# Patient Record
(demographics unavailable — no encounter records)

---

## 2018-03-28 NOTE — HP
PRIMARY CARE PHYSICIAN:  In [place], [place], Dr. Epi Gordon.



DATE OF SERVICE: 03/27/2018

 

REASON FOR ADMISSION:  Direct admission from [facility] 
emergency room to our hospital for CHF exacerbation.

 

HISTORY OF PRESENT ILLNESS:  An 87-year-old male who has a history of chronic 
diastolic heart failure.  His last echo in our hospital in 08/2015, at that time
, EF was normal.  He has history of severe aortic stenosis and severe mitral 
regurgitation as well as severe tricuspid regurgitation.  This patient had 
aortic valve replacement.  He went to [place] emergency room with increasing 
shortness of breath.  Patient reports that the weather was bad in [place] 
and he got shortness of breath.  He was not able to take a deep breath and he 
was not improving and that is why his wife advised him to go to emergency room.
  At [place] emergency room, patient was found with elevated BNP.  
Creatinine 1.5, and a T inversion in lead V1-V3.  Patient was also having 
increasing lower extremity edema and orthopnea as well as dyspnea on exertion 
and considering CHF exacerbation.  This patient was directly admitted to 
telemetry floor.  As per my report at [place] emergency room, physician 
spoke with Dr. Harvey, who advised this patient to go to telemetry floor for 
direct admission.

 

When I saw this patient at that time, patient was comfortable.  He reports that 
he is feeling much better since the emergency room treatment.

 

Patient repaired another echocardiography in 07/2017, at that time, which 
showed normal EF as well as normal diastolic function and normal functioning 
bioprosthetic valve in the aortic position.

 

PAST MEDICAL HISTORY:

1.  History of CVA, hypertension, dyslipidemia, coronary artery disease with 
history of CABG x1 in 08/2015

2.  History of aortic stenosis required aortic valve replacement in 08/2015 by 
Dr. Hobson with Magna bioprosthetic valve

3.  History of severe mitral regurgitation and severe tricuspid regurgitation.

 

PAST SURGICAL HISTORY:  Aortic valve replacement with bioprosthetic valve, CABG 
x1, thyroidectomy.

 

PAST PSYCHIATRIC HISTORY:  Reviewed and negative.

 

CODE STATUS:  FULL CODE.  Patient's wife is surrogate decision maker.

 

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  He is 
totally independent.  He has no history of tobacco, alcohol, or illicit drug 
abuse.

 

ALLERGIES:  The patient is allergic to IODINE.

 

FAMILY HISTORY:  No strong family history of premature coronary artery disease, 
stroke, or cancer.

 

REVIEW OF SYSTEMS:  The following complete review of systems was negative, 
unless otherwise mentioned in the HPI or below:

Constitutional:  Weight loss or gain, ability to conduct usual activities.

Skin:  Rash, itching.

Eyes:  Double vision, pain.

ENT/Mouth:  Nose bleeding, neck stiffness, pain, tenderness.

Cardiovascular:  Palpitations, dyspnea on exertion, orthopnea.

Respiratory:  Shortness of breath, wheezing, cough, hemoptysis, fever or night 
sweats.

Gastrointestinal:  Poor appetite, abdominal pain, heartburn, nausea, vomiting, 
constipation, or diarrhea.

Genitourinary:  Urgency, frequency, dysuria, nocturia.

Musculoskeletal:  Pain, swelling.

Neurologic/Psychiatric:  Anxiety, depression.

Allergy/Immunologic:  Skin rash, bleeding tendency.

 

Please see my HPI for pertinent positive and negative.  All other review of 
systems reviewed and negative except as mentioned in the HPI.

 

EMERGENCY ROOM COURSE:  Patient is given Lasix at other emergency room.

 

CURRENT HOME MEDICATIONS:  Amlodipine 5 mg p.o. daily, aspirin 325 mg p.o. daily
, vitamin D3 of 1000 unit p.o. daily, Plavix 75 mg p.o. daily, vitamin B12 of 
1000 mcg p.o. daily, lisinopril 10 mg p.o. daily, Zocor 20 mg p.o. at bedtime, 
Coenzyme Q10 of 100 mg p.o. daily.

 

PHYSICAL EXAMINATION:

VITAL SIGNS:  Currently, temperature 97.5, pulse 77, respiratory rate 18, 
saturation 100% on 2 liter oxygen, blood pressure 134/89, weight 172 pounds.

GENERAL:  Patient is currently alert, awake, no obvious acute distress.

HEENT:  Normocephalic, atraumatic.  Eyes:  Pupils round, reactive to light.  
Extraocular muscle intact.

ENT:  Oropharynx within normal limits.  Moist mucous membranes, no oral lesion, 
no pharyngeal erythema, no exudate.

NECK:  Supple, no JVD, no thyromegaly, no carotid bruit.

LUNGS:  Air entry reduced at base.  Few basal rales noted.

CARDIAC:  S1, S2 appears regular.  Systolic murmur noted parasternally with 
bioprosthetic click.  No gallop, no rub.

ABDOMEN:  Soft, bowel sounds present, nontender, nondistended.  No organomegaly
, no mass, no suprapubic tenderness.

BACK:  Unremarkable, no CVA tenderness.

EXTREMITIES:  Upper extremity, passive movement of all joints are normal.  
Lower extremity, trace bilateral pitting edema noted.  Good distal pulsation.  
No calf tenderness.

SKIN:  No skin rash.

HEMATOLOGICAL:  No lymphadenopathy.

PSYCHIATRIC:  Normal affect.

 

SIGNIFICANT LABORATORY DATA:  , creatinine 1.5.  EKG, T inversion in 
lead V1-V3.  Chest x-ray showing small pleural effusion, pulmonary vascular 
congestion.  All other blood tests including CBC, BMP, and coagulation profile 
noted at a local emergency room in [place].

 

ASSESSMENT AND PLAN:

IMPRESSION:

1.  Acute on chronic diastolic congestive heart failure exacerbation.

2.  History of aortic stenosis with a history of bioprosthetic aortic valve 
replacement.

3.  History of coronary artery disease with a history of coronary artery bypass 
grafting x1.

4.  Hypertension.

5.  Dyslipidemia.

6.  Chronic kidney disease stage 3.

 

PLAN:  Admission to telemetry floor.  Continue Lasix 40 mg IV b.i.d., monitor 
input output chart.  Monitor labs and replacing electrolytes accordingly.  
Resume patient's home medications once verified.  DVT prophylaxis is Lovenox 40 
mg subcutaneously daily.  Gastrointestinal prophylaxis with Pepcid 20 mg p.o. 
b.i.d., DuoNeb q.6 hourly.  Monitor vitals and adjust blood pressure medication 
while in hospital.  Repeat labs tomorrow morning.

 

CODE STATUS:  The patient is FULL CODE.  The patient's wife is surrogate 
decision maker.

 

Disposition plan based on clinical course.  We are expecting patient's stay in 
hospital more than 2 midnights.  Plan of care discussed with the patient in 
detail.

 

MTDD

## 2018-03-28 NOTE — PDOC.PN
- Subjective


Encounter Start Date: 03/28/18


Encounter Start Time: 07:20





Pt seen for followup re:  CHF exacerbation.  Reports feeling better.  No nausea 

or vomiting.  Dyspnea is better.  No chest pain.





- Objective


Resuscitation Status: 


 











Resuscitation Status           FULL:Full Resuscitation














MAR Reviewed: Yes


Vital Signs & Weight: 


 Vital Signs (12 hours)











  Temp Pulse Pulse Pulse Resp BP BP


 


 03/28/18 12:12   80    14  


 


 03/28/18 12:00  97.8 F  75    16  


 


 03/28/18 08:47    77  80   127/75  136/80


 


 03/28/18 08:00  98.1 F  74    18  


 


 03/28/18 07:03   70    14  


 


 03/28/18 04:00  98.2 F  76    16  














  BP Pulse Ox Pulse Ox Pulse Ox


 


 03/28/18 12:12    


 


 03/28/18 12:00  128/74  96  


 


 03/28/18 08:47    91 L  92 L


 


 03/28/18 08:00  130/74  98  


 


 03/28/18 07:03    


 


 03/28/18 04:00  117/71  96  








 Weight











Weight                         172 lb 1 oz














I&O: 


 











 03/27/18 03/28/18 03/29/18





 06:59 06:59 06:59


 


Intake Total  240 


 


Balance  240 











Result Diagrams: 


 03/28/18 04:51





 03/28/18 04:51


EKG Reviewed by me: Yes (Tele:  NSR)





Phys Exam





- Physical Examination


Constitutional: NAD


HEENT: PERRLA, moist MMs, sclera anicteric, oral pharynx no lesions


Neck: no nodes, supple, full ROM


JVD


Respiratory: no wheezing, no rhonchi


Bibasal crackles


Cardiovascular: RRR, no rub


Gastrointestinal: soft, non-tender


Musculoskeletal: edema present


Neurological: moves all 4 limbs


Psychiatric: normal affect


Skin: no rash





Dx/Plan


(1) Acute on chronic diastolic (congestive) heart failure


Code(s): I50.33 - ACUTE ON CHRONIC DIASTOLIC (CONGESTIVE) HEART FAILURE   Status

: Acute   Comment: Continue diuretics, await cardiology consult   





(2) CAD (coronary artery disease)


Code(s): I25.10 - ATHSCL HEART DISEASE OF NATIVE CORONARY ARTERY W/O ANG PCTRS 

  Status: Chronic   Comment: stable   





(3) HLD (hyperlipidemia)


Code(s): E78.5 - HYPERLIPIDEMIA, UNSPECIFIED   Status: Chronic   Comment: 

continue statin   





(4) HTN (hypertension)


Code(s): I10 - ESSENTIAL (PRIMARY) HYPERTENSION   Status: Chronic   


Qualifiers: 


   Hypertension type: essential hypertension   Qualified Code(s): I10 - 

Essential (primary) hypertension   


Comment: Monitor vital signs, titrate antihypertensives as needed   





(5) H/O aortic valve replacement


Code(s): Z95.2 - PRESENCE OF PROSTHETIC HEART VALVE   Status: Chronic   Comment

: stable   





- Plan





* .








Review of Systems





- Review of Systems


Constitutional: negative: fever, chills, sweats, weakness, malaise


Respiratory: Shortness of Breath, SOB with Excertion.  negative: Cough, Dry, 

Hemoptysis, Pleuritic Pain, Sputum, Wheezing


Cardiovascular: negative: chest pain, palpitations, orthopnea, paroxysmal 

nocturnal dyspnea, edema, light headedness


Gastrointestinal: negative: Nausea, Vomiting, Abdominal Pain, Diarrhea, 

Constipation, Melena, Hematochezia


Skin: negative: Rash, Lesions, May[place], Bruising





- Medications/Allergies


Allergies/Adverse Reactions: 


 Allergies











Allergy/AdvReac Type Severity Reaction Status Date / Time


 


iodine Allergy  Rash Verified 08/11/15 00:42


 


Sulfa (Sulfonamide Allergy   Verified 07/11/17 19:48





Antibiotics)     











Medications: 


 Current Medications





Acetaminophen (Tylenol)  650 mg PO Q4H PRN


   PRN Reason: Headache/Fever or Pain


Hydrocodone Bitart/Acetaminophen (Norco 5/325)  1 tab PO Q4H PRN


   PRN Reason: Moderate Pain (4-6)


Al Hydroxide/Mg Hydroxide (Maalox)  30 ml PO Q6H PRN


   PRN Reason: Heartburn  or Indigestion


Albuterol/Ipratropium (Duoneb)  3 ml NEB [id number] [facility], [facility]


   Last Admin: 03/28/18 12:12 Dose:  3 ml


Artificial Tears (Tears Naturale)  0 drop EA EYE PRN PRN


   PRN Reason: Dry Eyes


Enoxaparin Sodium (Lovenox)  40 mg SC 0900 [facility], [facility]


   Last Admin: 03/28/18 08:44 Dose:  40 mg


Famotidine (Pepcid)  20 mg PO BID [facility]


   Last Admin: 03/28/18 08:44 Dose:  20 mg


Furosemide (Lasix)  40 mg SLOW IVP 0600,1400 [facility]


   Last Admin: 03/28/18 05:39 Dose:  40 mg


Guaifenesin (Robitussin Sf)  200 mg PO Q4H PRN


   PRN Reason: Cough


Hydralazine HCl (Apresoline)  10 mg SLOW IVP Q4H PRN


   PRN Reason: Systolic BP > 180


Loperamide HCl (Imodium)  2 mg PO PRN PRN


   PRN Reason: Diarrhea/Loose Stools


Loratadine (Claritin)  10 mg PO DAILYPRN PRN


   PRN Reason: Sinus Symptoms


Magnesium Hydroxide (Milk Of Magnesium)  30 ml PO DAILYPRN PRN


   PRN Reason: Constipation


Mineral Oil/White Petrolatum (Eucerin Cream)  0 gm TOP BIDPRN PRN


   PRN Reason: Dry Skin


Nitroglycerin (Nitrostat)  0.4 mg SL Q5MIN PRN


   PRN Reason: Chest Pain


Ondansetron HCl (Zofran Odt)  4 mg PO Q6H PRN


   PRN Reason: Nausea/Vomiting


Ondansetron HCl (Zofran)  4 mg IVP Q6H PRN


   PRN Reason: Nausea/Vomiting


Phenol (Chloraseptic Spray 180 Ml Bot)  0 ml PO PRN PRN


   PRN Reason: Sore Throat


Potassium Chloride (K-Dur)  40 meq PO NOW [facility]


   Stop: 03/28/18 15:30


Senna (Senokot)  2 tab PO HSPRN PRN


   PRN Reason: Constipation


Sodium Chloride (Ocean Nasal Spray 0.65%)  0 ml EA NARE QIDPRN PRN


   PRN Reason: Nasal Congestion


Sodium Chloride (Flush - Normal Saline)  10 ml IVF Q12HR [facility], [facility]


   Last Admin: 03/28/18 08:45 Dose:  10 ml


Sodium Chloride (Flush - Normal Saline)  10 ml IVF PRN PRN


   PRN Reason: Saline Flush


Zolpidem Tartrate (Ambien)  5 mg PO HSPRN PRN


   PRN Reason: Insomnia

## 2018-03-29 NOTE — PDOC.EVN
Event Note





- Event Note


Event Note: 





Pt has elevated d-dimer.  Discussed with patient and wife.  They both report he 

is not allergic to iodine or IV dye, had cardiac cath and CT angiograms in the 

past.  Will check CT to r/o PE.


Pt also appears to be developing delirium, oriented to person only.  No 

evidence of infection at this time, pt is not hypoxic.  Will check TSH and 

liver profile, will continue to monitor.  Neuro exam was nonfocal.

## 2018-03-29 NOTE — CT
CTA OF THE CHEST WITH CONTRAST

3/29/18

 

COMPARISON:  

7/12/17

 

HISTORY: 

Shortness of breath. 

 

TECHNIQUE:  

Multiple contiguous axial images are obtained in a CTA of the chest with contrast per pulmonary embol
ism protocol. 3D oblique MIP reformats and direct coronal reformats were performed.

 

FINDINGS:  

There is an abnormal web-like appearance within the main pulmonary arteries. These may represent reca
nalized remote bilateral pulmonary emboli. This could also represent some short of unusual mixing art
ifact. These abnormalities extend into the pulmonary arteries to the lower lobes. No complete occlusi
on of the pulmonary artery and vessels is seen. 

 

The heart is enlarged. There is straightening of the interventricular septum which is unchanged ruth
red to the prior examination. No hilar or mediastinal lymphadenopathy are appreciated. 

 

There are small bilateral pleural effusions with adjacent atelectasis. There is a 1.9 cm mass-like ar
ea in the lingula which has slightly enlarged compared to the prior examination. This could represent
 mild atelectasis but a pulmonary mass cannot be excluded. 

 

The visualized subdiaphragmatic structures are unremarkable. Degenerative changes are seen in the spi
ne. The chest wall soft tissues are unremarkable. 

 

IMPRESSION:  

1.      There are web-like opacities in the pulmonary arteries. These do not have the appearance of a
cute pulmonary emboli but these could represent recanalized chronic remote pulmonary emboli. These we
re not seen on the prior examination from 2017.

2.      Bilateral pleural effusions. 

3.      There is a mass-like area in the lingula which appears to have enlarged compared to the prior
 examination. This could represent round atelectasis but a pulmonary mass cannot be excluded. 

 

Code T

 

POS: JESSICA

## 2018-03-29 NOTE — CON
DATE OF CONSULTATION:  03/28/2018

 

PRIMARY CARDIOLOGIST:  Nam Harvey MD

 

REASON FOR ADMISSION:  Congestive heart failure.

 

HISTORY OF PRESENT ILLNESS:  Mr. Sebastián Murphy is a very pleasant 87-year-old gentleman with history of 
coronary artery disease and aortic valve disease, previous aortic valve replacement.  He was sent her
e for progressive swelling of his lower extremities and shortness of breath and has been admitted for
 further therapy.

 

The patient is not having chest pain or pressure, but he has been short of breath and having increasi
ng edema.

 

He was transferred from [place].

 

PAST MEDICAL HISTORY:

1.  History of stroke.

2.  History of bypass x1 and aortic valve replacement in 2015.

3.  History of mitral regurgitation.

 

PAST SURGICAL HISTORY:  Previous aortic valve replacement with prosthetic valve, bioprosthetic.

 

PSYCHIATRIC HISTORY:  Negative.

 

SOCIAL HISTORY:  No alcohol or tobacco.

 

ALLERGIES:  IODINE.

 

FAMILY HISTORY:  No strong family history of coronary artery disease.

 

REVIEW OF SYSTEMS:  Constitutional:  Some confusion and disorientation.  Vision:  No changes.  Hearin
g:  No changes.  Pulmonary:  Shortness of breath.  Cardiac:  Shortness of breath.  Gastrointestinal: 
 No nausea, vomiting, or diarrhea.  Skin:  No rashes.  Neurologic:  No unilateral weakness or numbnes
s.  Psychiatric:  No unusual depression or anxiety.  Hematologic:  No unusual bruising.  Genitourinar
y:  No burning with urination.

 

HOME MEDICATIONS:

1.  Amlodipine.

2.  Aspirin.

3.  Plavix.

4.  Lisinopril.

5.  Zocor.

 

PHYSICAL EXAMINATION:

GENERAL:  A pleasant elderly gentleman.  He is a little disoriented now; it is nightime and he is a l
ittle turned around, but he orients quickly.

VITAL SIGNS:  Blood pressure is 139/70, pulse 80.

HEENT:  Eyes:  Sclerae nonicteric.  Mouth:  Mucous membranes moist.

NECK:  Supple.  No lymphadenopathy.

LUNGS:  Few basilar rales.

CARDIOVASCULAR:  There is a harsh systolic murmur at the left upper sternal border.  No diastolic mur
mur, no S3.

ABDOMEN:  Soft, nontender.

EXTREMITIES:  No clubbing or cyanosis.  There is moderate edema.

SKIN:  Warm and dry.

 

PERTINENT LABORATORY AND X-RAY FINDINGS:  Potassium 3.4, creatinine is 1.06.   back in 2017.  
EKG is currently in sinus rhythm.  The 12-lead EKG does show some T-wave inversions in V3.  No other 
T-wave changes, no significant ST changes.  The most recent echocardiogram in 2015, had severe aortic
 stenosis that was pre-surgery, but the echocardiogram done 2017, revealed normal ejection fraction o
f 60-65%.

 

ASSESSMENT:

1.  Systolic heart failure, likely diastolic, acute on chronic.

2.  Previous aortic valve replacement.

3.  Volume overload.

 

PLAN:

1.  Agree with intravenous Lasix.

2.  Echocardiogram to be repeated.

3.  Dr. Harvey to resume patient's care tomorrow morning.

## 2018-03-29 NOTE — PDOC.EVN
Event Note





- Event Note


Event Note: 





CTA report reviewed.  Appears to be old pulmonary emboli, no acute PE.  Updated 

pt and wife.  In terms of confusion, pt's wife reports that the confusion 

started prior to ER visit (cause of the visit).  Will continue to monitor and 

look for other etiologies.

## 2018-03-29 NOTE — PDOC.PN
- Subjective


Encounter Start Date: 03/29/18


Encounter Start Time: 11:00





Pt seen for followup re: CHF exacerbation.  Reports feeling well.  Reports 

having chest pain with deep breaths earlier.  No nausea or vomiting.





- Objective


Resuscitation Status: 


 











Resuscitation Status           FULL:Full Resuscitation














MAR Reviewed: Yes


Vital Signs & Weight: 


 Vital Signs (12 hours)











  Temp Pulse Resp BP Pulse Ox


 


 03/29/18 08:18      100


 


 03/29/18 08:15   75  15   100


 


 03/29/18 08:14  96.2 F L  78  20  137/78  100


 


 03/29/18 08:00  96.2 F L  75  15  


 


 03/29/18 04:00  97.7 F  76  20  109/61  99








 Weight











Weight                         171 lb 9.6 oz














I&O: 


 











 03/28/18 03/29/18 03/30/18





 06:59 06:59 06:59


 


Intake Total 240 460 


 


Output Total  350 


 


Balance 240 110 











Result Diagrams: 


 03/29/18 05:02





 03/29/18 05:02


EKG Reviewed by me: Yes (Tele:  NSR)





Phys Exam





- Physical Examination


Constitutional: NAD


HEENT: PERRLA, moist MMs, sclera anicteric, oral pharynx no lesions


Neck: no nodes, supple, full ROM


JVD


Respiratory: no wheezing, no rales, no rhonchi, clear to auscultation bilateral


Cardiovascular: RRR, no rub


Gastrointestinal: soft, non-tender, positive bowel sounds


Trace edema anika LE


Neurological: moves all 4 limbs


Psychiatric: normal affect


Skin: no rash





Dx/Plan


(1) Acute on chronic diastolic (congestive) heart failure


Code(s): I50.33 - ACUTE ON CHRONIC DIASTOLIC (CONGESTIVE) HEART FAILURE   Status

: Acute   Comment: Continue diuretics.  Appreciate cardiology consult.  2D echo 

pending.   





(2) Chest pain


Code(s): R07.9 - CHEST PAIN, UNSPECIFIED   Status: Acute   Comment: Brief 

episode of pleuritic chest pain, check d-dimer to r/o PE   





(3) CAD (coronary artery disease)


Code(s): I25.10 - ATHSCL HEART DISEASE OF NATIVE CORONARY ARTERY W/O ANG PCTRS 

  Status: Chronic   Comment: stable   





(4) HLD (hyperlipidemia)


Code(s): E78.5 - HYPERLIPIDEMIA, UNSPECIFIED   Status: Chronic   Comment: 

continue statin   





(5) HTN (hypertension)


Code(s): I10 - ESSENTIAL (PRIMARY) HYPERTENSION   Status: Chronic   


Qualifiers: 


   Hypertension type: essential hypertension   Qualified Code(s): I10 - 

Essential (primary) hypertension   


Comment: titrate antihypertensives as needed   





(6) H/O aortic valve replacement


Code(s): Z95.2 - PRESENCE OF PROSTHETIC HEART VALVE   Status: Chronic   Comment

: stable   





- Plan


plan discussed w/ family, PT/OT, out of bed/ambulate





* .


Likely home 24-48 hrs.





Review of Systems





- Review of Systems


Constitutional: negative: fever, chills, sweats, weakness, malaise


Respiratory: SOB with Excertion.  negative: Cough, Dry, Shortness of Breath, 

Hemoptysis, Pleuritic Pain, Sputum, Wheezing


Cardiovascular: chest pain.  negative: palpitations, orthopnea, paroxysmal 

nocturnal dyspnea, edema, light headedness


Gastrointestinal: negative: Nausea, Vomiting, Abdominal Pain, Diarrhea, 

Constipation, Melena, Hematochezia


Genitourinary: negative: Dysuria, Frequency, Incontinence, Hematuria, Retention





- Medications/Allergies


Allergies/Adverse Reactions: 


 Allergies











Allergy/AdvReac Type Severity Reaction Status Date / Time


 


iodine Allergy  Rash Verified 08/11/15 00:42


 


Sulfa (Sulfonamide Allergy   Verified 07/11/17 19:48





Antibiotics)     











Medications: 


 Current Medications





Acetaminophen (Tylenol)  650 mg PO Q4H PRN


   PRN Reason: Headache/Fever or Pain


Hydrocodone Bitart/Acetaminophen (Norco 5/325)  1 tab PO Q4H PRN


   PRN Reason: Moderate Pain (4-6)


Al Hydroxide/Mg Hydroxide (Maalox)  30 ml PO Q6H PRN


   PRN Reason: Heartburn  or Indigestion


Albuterol/Ipratropium (Duoneb)  3 ml NEB [id number] [facility]


   Last Admin: 03/29/18 08:15 Dose:  3 ml


Amlodipine Besylate (Norvasc)  5 mg PO DAILY [facility]


   Last Admin: 03/29/18 10:55 Dose:  5 mg


Artificial Tears (Tears Naturale)  0 drop EA EYE PRN PRN


   PRN Reason: Dry Eyes


Aspirin (Ecotrin)  325 mg PO DAILY [facility]


   Last Admin: 03/29/18 10:54 Dose:  325 mg


Atorvastatin Calcium (Lipitor)  10 mg PO HS [facility]


   Last Admin: 03/28/18 21:08 Dose:  10 mg


Cholecalciferol (Vitamin D3)  1,000 units PO DAILY [facility]


   Last Admin: 03/29/18 10:54 Dose:  1,000 units


Clopidogrel Bisulfate (Plavix)  75 mg PO DAILY [facility]


   Last Admin: 03/29/18 10:55 Dose:  75 mg


Coenzyme Q10 (Coenzyme Q10)  100 mg PO DAILY [facility]


   Last Admin: 03/29/18 10:53 Dose:  100 mg


Cyanocobalamin (Vitamin B-12)  1,000 mcg PO DAILY [facility]


   Last Admin: 03/29/18 10:55 Dose:  1,000 mcg


Enoxaparin Sodium (Lovenox)  40 mg SC 0900 [facility]


   Last Admin: 03/29/18 10:56 Dose:  40 mg


Famotidine (Pepcid)  20 mg PO 0900 [facility]


Furosemide (Lasix)  40 mg SLOW IVP 0600,1400 [facility]


   Last Admin: 03/29/18 05:36 Dose:  40 mg


Guaifenesin (Robitussin Sf)  200 mg PO Q4H PRN


   PRN Reason: Cough


Hydralazine HCl (Apresoline)  10 mg SLOW IVP Q4H PRN


   PRN Reason: Systolic BP > 180


Lisinopril (Zestril)  10 mg PO DAILY [facility]


   Last Admin: 03/29/18 10:54 Dose:  10 mg


Loperamide HCl (Imodium)  2 mg PO PRN PRN


   PRN Reason: Diarrhea/Loose Stools


Loratadine (Claritin)  10 mg PO DAILYPRN PRN


   PRN Reason: Sinus Symptoms


Magnesium Hydroxide (Milk Of Magnesium)  30 ml PO DAILYPRN PRN


   PRN Reason: Constipation


Mineral Oil/White Petrolatum (Eucerin Cream)  0 gm TOP BIDPRN PRN


   PRN Reason: Dry Skin


Nitroglycerin (Nitrostat)  0.4 mg SL Q5MIN PRN


   PRN Reason: Chest Pain


Ondansetron HCl (Zofran Odt)  4 mg PO Q6H PRN


   PRN Reason: Nausea/Vomiting


Ondansetron HCl (Zofran)  4 mg IVP Q6H PRN


   PRN Reason: Nausea/Vomiting


Phenol (Chloraseptic Spray 180 Ml Bot)  0 ml PO PRN PRN


   PRN Reason: Sore Throat


Senna (Senokot)  2 tab PO HSPRN PRN


   PRN Reason: Constipation


Sodium Chloride (Ocean Nasal Spray 0.65%)  0 ml EA NARE QIDPRN PRN


   PRN Reason: Nasal Congestion


Sodium Chloride (Flush - Normal Saline)  10 ml IVF Q12HR [facility]


   Last Admin: 03/28/18 21:08 Dose:  10 ml


Sodium Chloride (Flush - Normal Saline)  10 ml IVF PRN PRN


   PRN Reason: Saline Flush


   Last Admin: 03/29/18 05:37 Dose:  10 ml


Zolpidem Tartrate (Ambien)  5 mg PO HSPRN PRN


   PRN Reason: Insomnia

## 2018-03-30 NOTE — PDOC.PN
- Subjective


Encounter Start Date: 03/30/18


Encounter Start Time: 08:00





Pt seen for followup re: acute encephalopathy.  Continues to be oriented to 

person only, denies any complaints.





- Objective


Resuscitation Status: 


 











Resuscitation Status           FULL:Full Resuscitation














MAR Reviewed: Yes


Vital Signs & Weight: 


 Vital Signs (12 hours)











  Temp Pulse Resp BP BP Pulse Ox


 


 03/30/18 16:00  98.1 F   18   120/70  95


 


 03/30/18 15:25       96


 


 03/30/18 15:22   77  16   


 


 03/30/18 11:55  97.6 F  77  20   120/70  94 L


 


 03/30/18 09:16   72   109/68  


 


 03/30/18 08:00  97.7 F  72  18   109/68  93 L








 Weight











Weight                         161 lb 14.4 oz














I&O: 


 











 03/29/18 03/30/18 03/31/18





 06:59 06:59 06:59


 


Intake Total 460 240 


 


Output Total 350 250 


 


Balance 110 -10 











Result Diagrams: 


 03/31/18 05:00





 03/31/18 05:00


EKG Reviewed by me: Yes (Tele:  NSR)





Phys Exam





- Physical Examination


Constitutional: NAD


HEENT: moist MMs, sclera anicteric


Neck: no nodes, supple, full ROM


JVD


Bibasal crackles


Cardiovascular: RRR, no rub


Gastrointestinal: soft, no distention, positive bowel sounds


Neurological: moves all 4 limbs


Psychiatric: normal affect


Deviation from normal: Oriented to person only





Dx/Plan


(1) Acute encephalopathy


Code(s): G93.40 - ENCEPHALOPATHY, UNSPECIFIED   Status: Acute   Comment: 

Present on admission, no clear etioology at this time.  Continue to monitor and 

workup.   





(2) Acute on chronic diastolic (congestive) heart failure


Code(s): I50.33 - ACUTE ON CHRONIC DIASTOLIC (CONGESTIVE) HEART FAILURE   Status

: Acute   Comment: Continue diuretics.     





(3) Chest pain


Code(s): R07.9 - CHEST PAIN, UNSPECIFIED   Status: Acute   Comment: Pt appears 

to have had an old PE, also has RV dilatation on 2D echo.  recent echo done in 

Feb 2018 at his cardiologist's office showed normal RV.  Pulmonology consulted 

for opinion and help with management.   





(4) CAD (coronary artery disease)


Code(s): I25.10 - ATHSCL HEART DISEASE OF NATIVE CORONARY ARTERY W/O ANG PCTRS 

  Status: Chronic   Comment: stable   





(5) HLD (hyperlipidemia)


Code(s): E78.5 - HYPERLIPIDEMIA, UNSPECIFIED   Status: Chronic   Comment: on 

statin   





(6) HTN (hypertension)


Code(s): I10 - ESSENTIAL (PRIMARY) HYPERTENSION   Status: Chronic   


Qualifiers: 


   Hypertension type: essential hypertension   Qualified Code(s): I10 - 

Essential (primary) hypertension   


Comment: Continue to monitor vital signs and titrate antihypertensives as 

needed   





(7) H/O aortic valve replacement


Code(s): Z95.2 - PRESENCE OF PROSTHETIC HEART VALVE   Status: Chronic   Comment

: stable   





- Plan


plan discussed w/ family





* .








Review of Systems





- Review of Systems


Constitutional: negative: fever, chills, sweats, weakness, malaise


Respiratory: negative: Cough, Shortness of Breath, Hemoptysis, SOB with 

Excertion, Pleuritic Pain, Wheezing


Cardiovascular: negative: chest pain, palpitations, orthopnea, paroxysmal 

nocturnal dyspnea, edema, light headedness





- Medications/Allergies


Allergies/Adverse Reactions: 


 Allergies











Allergy/AdvReac Type Severity Reaction Status Date / Time


 


Sulfa (Sulfonamide Allergy   Verified 07/11/17 19:48





Antibiotics)     











Medications: 


 Current Medications





Acetaminophen (Tylenol)  650 mg PO Q4H PRN


   PRN Reason: Headache/Fever or Pain


Hydrocodone Bitart/Acetaminophen (Norco 5/325)  1 tab PO Q4H PRN


   PRN Reason: Moderate Pain (4-6)


Al Hydroxide/Mg Hydroxide (Maalox)  30 ml PO Q6H PRN


   PRN Reason: Heartburn  or Indigestion


Albuterol/Ipratropium (Duoneb)  3 ml NEB [id number] [facility]


   Last Admin: 03/30/18 15:22 Dose:  3 ml


Amlodipine Besylate (Norvasc)  5 mg PO DAILY [facility]


   Last Admin: 03/30/18 09:16 Dose:  5 mg


Artificial Tears (Tears Naturale)  0 drop EA EYE PRN PRN


   PRN Reason: Dry Eyes


Aspirin (Ecotrin)  325 mg PO DAILY [facility]


   Last Admin: 03/30/18 09:14 Dose:  325 mg


Atorvastatin Calcium (Lipitor)  10 mg PO HS [facility]


   Last Admin: 03/29/18 20:32 Dose:  10 mg


Cholecalciferol (Vitamin D3)  1,000 units PO DAILY [facility]


   Last Admin: 03/30/18 09:16 Dose:  1,000 units


Clopidogrel Bisulfate (Plavix)  75 mg PO DAILY [facility]


   Last Admin: 03/30/18 09:17 Dose:  75 mg


Coenzyme Q10 (Coenzyme Q10)  100 mg PO DAILY [facility]


   Last Admin: 03/30/18 09:19 Dose:  100 mg


Cyanocobalamin (Vitamin B-12)  1,000 mcg PO DAILY [facility]


   Last Admin: 03/30/18 09:14 Dose:  1,000 mcg


Enoxaparin Sodium (Lovenox)  40 mg SC 0900 [facility]


   Last Admin: 03/30/18 09:17 Dose:  40 mg


Famotidine (Pepcid)  20 mg PO 0900 [facility]


   Last Admin: 03/30/18 09:17 Dose:  20 mg


Furosemide (Lasix)  40 mg SLOW IVP 0600,1400 [facility]


   Last Admin: 03/30/18 14:40 Dose:  40 mg


Guaifenesin (Robitussin Sf)  200 mg PO Q4H PRN


   PRN Reason: Cough


Hydralazine HCl (Apresoline)  10 mg SLOW IVP Q4H PRN


   PRN Reason: Systolic BP > 180


Lisinopril (Zestril)  10 mg PO DAILY [facility]


   Last Admin: 03/30/18 09:16 Dose:  10 mg


Loperamide HCl (Imodium)  2 mg PO PRN PRN


   PRN Reason: Diarrhea/Loose Stools


Loratadine (Claritin)  10 mg PO DAILYPRN PRN


   PRN Reason: Sinus Symptoms


Magnesium Hydroxide (Milk Of Magnesium)  30 ml PO DAILYPRN PRN


   PRN Reason: Constipation


Metoprolol Tartrate (Lopressor)  12.5 mg PO BID [facility]


Mineral Oil/White Petrolatum (Eucerin Cream)  0 gm TOP BIDPRN PRN


   PRN Reason: Dry Skin


Nitroglycerin (Nitrostat)  0.4 mg SL Q5MIN PRN


   PRN Reason: Chest Pain


Ondansetron HCl (Zofran Odt)  4 mg PO Q6H PRN


   PRN Reason: Nausea/Vomiting


Ondansetron HCl (Zofran)  4 mg IVP Q6H PRN


   PRN Reason: Nausea/Vomiting


Phenol (Chloraseptic Spray 180 Ml Bot)  0 ml PO PRN PRN


   PRN Reason: Sore Throat


Senna (Senokot)  2 tab PO HSPRN PRN


   PRN Reason: Constipation


Sodium Chloride (Ocean Nasal Spray 0.65%)  0 ml EA NARE QIDPRN PRN


   PRN Reason: Nasal Congestion


Sodium Chloride (Flush - Normal Saline)  10 ml IVF Q12HR [facility]


   Last Admin: 03/30/18 09:18 Dose:  10 ml


Sodium Chloride (Flush - Normal Saline)  10 ml IVF PRN PRN


   PRN Reason: Saline Flush


   Last Admin: 03/30/18 05:35 Dose:  10 ml


Zolpidem Tartrate (Ambien)  5 mg PO HSPRN PRN


   PRN Reason: Insomnia

## 2018-03-30 NOTE — CT
CT OF THE BRAIN WITHOUT CONTRAST:

 

Date:  03/30/18 

 

COMPARISON:  

None. 

 

HISTORY:  

Confusion, with possible stroke. 

 

TECHNIQUE:  

Multiple contiguous axial images were obtained in a CT of the brain without contrast. 

 

FINDINGS:

There are scattered hypodensities in the subcortical and periventricular white matter, likely seconda
ry to small vessel ischemic disease. There is encephalomalacia in the left frontal lobe from prior re
mote infarction. No new confluent infarction is seen. There is no evidence of hydrocephalus, intracra
nial hemorrhage, or extra-axial fluid collection. 

 

The calvarium and overlying soft tissues are unremarkable. The visualized paranasal sinuses and masto
id air cells are well aerated. 

 

IMPRESSION: 

No evidence of acute intracranial abnormality.  

 

POS: [facility]

## 2018-03-31 NOTE — CON
DATE OF CONSULTATION:  2018

 

SERVICE:  Pulmonary Medicine.

 

REASON FOR CONSULTATION:  Abnormal CT.

 

HISTORY OF PRESENT ILLNESS:  The patient is a pleasant 87-year-old white male 
who presented to the hospital with confusion.  He typically is pretty sharp.  
His wife reports that he had increasing confusion brought him to the emergency 
department.  He was found to be floridly volume overloaded.  He has undergone 
aggressive diuretics.  Originally, he required some oxygen.  He was then 
titrated off of that onto room air.  He remains a little bit volume overloaded, 
but his volume status has improved dramatically.  He remains confused.  In 
investigation, hypoxemia was being undertaken.  D-dimer was abnormal, which 
prompted a CT of the chest with contrast.  There was an abnormal finding on it 
for which I was consulted.  He currently denies any chest pain, fevers, chills, 
nausea, vomiting or shortness of breath.  He is pleasantly confused and does 
not actually realize he is in the hospital right now.

 

PAST MEDICAL HISTORY:

1.  Hypertension.

2.  Dyslipidemia.

3.  Coronary artery disease.

4.  Severe mitral regurgitation.

5.  History of stroke.

 

PAST SURGICAL HISTORY:

1.  Aortic valve replacement with coronary artery bypass graft x1.

2.  Thyroidectomy.

 

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  He has no 
exposure to chemicals, dust, asbestos, or tuberculosis that he is aware of.  He 
denied any alcohol, tobacco or illicit drug use.  Prior to this hospital stay, 
he was completely independent.

 

FAMILY HISTORY:  Noncontributory.

 

ALLERGIES:  IODINE.

 

MEDICATIONS:  List of his inpatient medications were reviewed.  There are no 
specific updates made at this time.

 

REVIEW OF SYSTEMS:  General, head, ears, eyes, nose, throat, cardiovascular, 
respiratory, GI, , musculoskeletal, neurologic, and skin is negative except 
as mentioned in the HPI.

 

PHYSICAL EXAMINATION:

VITAL SIGNS:  Afebrile, pulse 77, blood pressure 120/70, respirations 18, 
saturation 95% on room air.

GENERAL:  The patient is awake and alert, no apparent distress.

LUNGS:  Decent air entry.  Dependent crackles are present.  No prolonged 
expiratory phase or wheezing is appreciated.

HEART:  Normal rate, regular.

ABDOMEN:  Soft, nontender, nondistended.  Bowel sounds are positive.

MUSCULOSKELETAL:  No cyanosis or clubbing.  There is trace 1+ pitting 
throughout bilateral lower extremities.  There is evidence of significant 
reduction in size of the edema here recently.

NEUROLOGIC:  Grossly nonfocal.

 

LABORATORY DATA:  WBC 4.2, hemoglobin 14.7, platelets 147,000.  Neutrophil 
count is 38% with only 2% bands.  Lymphocyte count is 46%.  INR 1.2.  
Creatinine 1.22 and up trending.  Basic metabolic profile is otherwise 
unremarkable.  TSH 2.6.  Potassium 3.4.  Urinalysis is unremarkable.

 

IMAGIN.  CT of the brain demonstrates no acute intracranial abnormality.

2.  Echocardiogram demonstrates severely enlarged right ventricular cavity.  
There is elevated right ventricular systolic pressure.  Severe mitral 
regurgitation is present with massive dilation in the left atrium.  Ejection 
fraction is normal.

3.  CT of the chest demonstrates findings consistent with massive volume 
overload including bilateral pleural effusions, interstitial fullness, ground 
glass opacifications throughout bilateral lung fields, and pulmonary vascular 
congestion.  There is also evidence of an abnormal filling change in the 
pulmonary artery.  He has a lingular nodule present which was here a year ago.  
Slightly increased in size, but so as all the other volume overload features.

 

ASSESSMENT:

1.  Acute hypoxic respiratory failure, resolved.

2.  Acute on chronic diastolic and valvular heart failure.

3.  Pulmonary nodule of the lingula.

4.  Pulmonary hypertension secondary to left heart disease, most likely.

5.  Abnormal filling defect in the pulmonary artery on CTA.

 

DISCUSSION AND PLAN:  I do not think the patient has a pulmonary embolism.  I 
do not think he had a subacute PE recently and is undergoing re-cannulation.  
My suspicion is that we were dealing with is a mixing defect coming from severe 
mitral regurgitant flow.  This is likely creating backflow through the 
pulmonary venous system.  At this point, would not recommend any 
anticoagulation moving forward.  If his presentation was PE driven, the patient 
would not have made such a profound recovery with diuretics.  We will reassess 
this in the outpatient setting in 4-6 weeks.  In the meantime, we will need to 
focus on making certain the patient remains at euvolemia.  Tight blood pressure 
control is warranted to decrease the amount of backflow through the mitral 
valve.  At this point, he has no further requirements for inpatient Pulmonary 
Critical Care opinion, and will sign off, but I would like for him to follow up 
with me in clinic in 4 to 6 weeks.  At that time, repeat CT of the chest will 
be considered, particularly if he has returned to euvolemia.

 

70 minutes have been devoted to this patient in various activities.  I 
personally reviewed all imaging studies and laboratory data noted within this 
document.  For fifty percent of this time, I was interacting with the patient 
at the bedside or coordinating care with the care team.  For the remainder of 
the time I was immediately available to the patient in the hospital unit.  



HARSH

## 2018-03-31 NOTE — DIS
DATE OF ADMISSION:  03/27/2018

 

DATE OF DISCHARGE:  03/31/2018

 

PRIMARY CARE PHYSICIAN:  Epi Gordon M.D.

 

DISCHARGE DIAGNOSES:

1.  Acute on chronic diastolic congestive heart failure.

2.  Volume overload.

3.  Delirium.

4.  Severe mitral regurgitation.

5.  Severely elevated pulmonary artery pressures.

 

CONDITION OF PATIENT ON THE DAY OF DISCHARGE:  Stable.  I assessed Mr. Murphy on the day of discharge
.  He denies any chest pain or shortness of breath.  Vital signs are stable.  S1 and S2 are heard, re
gular.  Lungs are clear to auscultation bilaterally.

 

DISCHARGE MEDICATIONS:  He has been started on furosemide 20 mg daily orally.  Otherwise, the preadmi
ssion home medications were not changed, as dictated on history and physical note from admission.

 

INVESTIGATIONS DURING THIS HOSPITALIZATION:  On 03/29/2018, patient had transthoracic echocardiograph
y which showed ejection fraction for the left ventricle at 60%-65%, severely enlarged right ventricle
 cavity, severely dilated left atrium, severe mitral regurgitation, mitral valve prolapse, moderate t
ricuspid regurgitation, pulmonary artery systolic pressure approximately 60 mmHg and gradients within
 normal range for prosthetic aortic valve.

 

On 03/29/2018, he had CT angiogram of the chest, which showed oblique opacities in the pulmonary tanesha
jennifer.  They do not have the appearance of acute pulmonary emboli, but could represent recanalized chr
onic remote pulmonary emboli, according to Radiology service.  He also had a mass-like area in the li
ngula which appeared to have enlarged compared to prior examination.  This could represent a round at
electasis, but a pulmonary mass could not be excluded.

 

On 03/30/2018, patient had a noncontrast CT scan of the brain, which did not show any evidence of acu
te intracranial abnormality.

 

CONSULTATIONS DURING THIS HOSPITALIZATION:  Cardiology, Dr. Ward and Pulmonology, Dr. Terrazas.

 

HOSPITAL COURSE:  Mr. Murphy is a pleasant 87-year-old gentleman who was admitted to [facility] on 03/27/2018 following transfer from [facility] Emergency Room for CHF ex
acerbation.  He received intravenous diuretics.  He was seen by Cardiology Service.  He had investiga
tions as described above.  He was also seen by Pulmonology Service for suspected chronic pulmonary em
bolus.  Pulmonology Service felt that the imaging abnormalities seen on CT scan was likely secondary 
to mixing defect coming from severe mitral regurgitant flow, likely creating back flow through the pu
lmonary venous system.  Pulmonary Service would like to follow up with the patient in 4-6 weeks.

 

Mr. Murphy has been cleared for discharge by both Pulmonology and Cardiology Services and is being di
scharged home in a stable condition.

 

On the day of discharge, he has a white count of 3700, hemoglobin 14.7, platelet count 142,000, sulema
l creatinine and normal electrolytes.  His TSH during this hospitalization was normal.  His BNP durin
g this hospitalization was elevated at 1114.8.  His total bilirubin was elevated at 2.1 on 03/29/2018
 and his direct bilirubin was elevated at 1.0.  He will need these tests rechecked as outpatient.

 

Many thanks for allowing me to participate in your patient's care.  Please feel free to contact me wi
th any questions or concerns.

 

DISCHARGE DESTINATION:  Home.

 

TOTAL AMOUNT OF TIME SPENT COORDINATING THIS DISCHARGE:  33 minutes.

## 2018-04-09 NOTE — ECHO
TRANSESOPHAGEAL ECHOCARDIOGRAM:

 

HISTORY: 

The patient is an 87-year-old gentleman with mitral regurgitation.

 

DESCRIPTION OF PROCEDURE:

The patient taken to PACU.  The patient sedated by anesthesiology transesophageal probe was placed in
to the distal esophagus and stomach.  Echocardiographic images were obtained.

 

FINDINGS:

1.  Left atrial enlargement.

2.  Marked right atrial enlargement.

3.  The right ventricle is markedly dilated.

4.  The left ventricle is mildly dilated.

5.  Ruptured chordae tendineae of the posterior leaflet was noted.

6.  Severe mitral regurgitation.

7.  Mild tricuspid regurgitation.

8.  Prosthetic aortic valve.

9.  Atherosclerotic debris in the descending aorta.  

 

IMPRESSION: 

Ruptured mitral chordae tendonae of the posterior mitral leaflet with severe mitral regurgitation.

 

 

POS: [facility]